# Patient Record
Sex: MALE | Race: WHITE | NOT HISPANIC OR LATINO | Employment: OTHER | URBAN - METROPOLITAN AREA
[De-identification: names, ages, dates, MRNs, and addresses within clinical notes are randomized per-mention and may not be internally consistent; named-entity substitution may affect disease eponyms.]

---

## 2017-07-13 ENCOUNTER — HOSPITAL ENCOUNTER (OUTPATIENT)
Dept: LAB | Facility: MEDICAL CENTER | Age: 52
End: 2017-07-13
Attending: PHYSICIAN ASSISTANT
Payer: COMMERCIAL

## 2017-07-13 ENCOUNTER — OFFICE VISIT (OUTPATIENT)
Dept: ENDOCRINOLOGY | Facility: MEDICAL CENTER | Age: 52
End: 2017-07-13
Payer: COMMERCIAL

## 2017-07-13 VITALS
WEIGHT: 177 LBS | HEART RATE: 86 BPM | BODY MASS INDEX: 23.98 KG/M2 | HEIGHT: 72 IN | DIASTOLIC BLOOD PRESSURE: 84 MMHG | OXYGEN SATURATION: 96 % | SYSTOLIC BLOOD PRESSURE: 138 MMHG

## 2017-07-13 DIAGNOSIS — E13.9 LADA (LATENT AUTOIMMUNE DIABETES IN ADULTS), MANAGED AS TYPE 2 (HCC): ICD-10-CM

## 2017-07-13 DIAGNOSIS — E10.649 TYPE 1 DIABETES MELLITUS WITH HYPOGLYCEMIA UNAWARENESS (HCC): ICD-10-CM

## 2017-07-13 DIAGNOSIS — Z79.4 ENCOUNTER FOR LONG-TERM (CURRENT) USE OF INSULIN (HCC): ICD-10-CM

## 2017-07-13 LAB
EST. AVERAGE GLUCOSE BLD GHB EST-MCNC: 120 MG/DL
HBA1C MFR BLD: 5.8 % (ref 0–5.6)

## 2017-07-13 PROCEDURE — 36415 COLL VENOUS BLD VENIPUNCTURE: CPT

## 2017-07-13 PROCEDURE — 84681 ASSAY OF C-PEPTIDE: CPT

## 2017-07-13 PROCEDURE — 83036 HEMOGLOBIN GLYCOSYLATED A1C: CPT

## 2017-07-13 PROCEDURE — 83516 IMMUNOASSAY NONANTIBODY: CPT

## 2017-07-13 PROCEDURE — 99204 OFFICE O/P NEW MOD 45 MIN: CPT | Performed by: PHYSICIAN ASSISTANT

## 2017-07-13 PROCEDURE — 95251 CONT GLUC MNTR ANALYSIS I&R: CPT | Performed by: PHYSICIAN ASSISTANT

## 2017-07-13 PROCEDURE — 86341 ISLET CELL ANTIBODY: CPT

## 2017-07-13 PROCEDURE — 86337 INSULIN ANTIBODIES: CPT

## 2017-07-13 RX ORDER — INSULIN GLARGINE 100 [IU]/ML
INJECTION, SOLUTION SUBCUTANEOUS NIGHTLY
COMMUNITY
End: 2017-07-24

## 2017-07-13 NOTE — PATIENT INSTRUCTIONS
START:  1. Trulicity 0.75 once a week  2.  Lantus 2 units for the next 3-4 days.  Then stop the Lantus  3.  Humalog stop only use if numbers are very high above 300 and if this happens give          2-3 units and drink a lot of water.  4.  Ketone Urine test strips - check every other day.  If Ketone levels start to increase let me know.  If Ketones increase the way to get this down is to drink a lot of water and take Humalog.

## 2017-07-13 NOTE — PROGRESS NOTES
New Patient Consult Note  Referred by: Mikael Tineo M.D.    Reason for consult: Type 1.5 with a CGM    HPI:  Aaron Rivero is a 51 y.o. old patient who is seeing us today for diabetes care.  This is a pleasant patient with diabetes and I appreciate the opportunity to participate in the care of this patient.  This is a new patient with me today.    From [facility]:  His  C-peptide is 0.34 and IAN-65  >250      BG Diary:7/13/2017  In the AM:  Did not bring    He has a Dexcom 5 CGM - this certainly helps him with his Hypoglycemic events.       Has been Diabetic since age 50  Has a Glucagon pen at home: no    1. 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    He is on Lantus 4 units  Humalog   1:15 carb ratio  ISF - He does not use    He was just diagnosed within the last 12 months.  If he uses more than 4 units of lantus he has hypoglycemic events.  He is not using a ISF because it will tank him.  He will decrease the Lantus to 2-3 units if he knows he is going to be exercising the next day, because if he does not it will tank his numbers.      This is a very interesting patient and I have called for his C-peptide and antibodies.        2. Encounter for long-term (current) use of insulin (CMS-HCC)    He is having lows weekly.     ROS:   Constitutional: No change in weight , No fatigue, No night sweats.  HEENT: No Headache.  Eyes:  No blurred vision, No visual changes.  Cardiac: No chest pain, No palpitations.  Resp: No shortness of breath, No cough,   Gastro: No nausea or vomiting, No diarrhea.  Neuro: Denies numbness or tinging in bilateral feet or hands, and no loss of sensation.  Endo: No heat or cold intolerance.  : No polyuria, No polydipsia, No chronic UTI's.  Lower extremities: No lower leg edema bilateral.  All other systems were reviewed and were  negative.    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 No pertinent past surgical history.    Allergies:  Review of patient's allergies indicates no known allergies.    Social History:  Social History     Social History   • Marital Status:      Spouse Name: N/A   • Number of Children: N/A   • Years of Education: N/A     Occupational History   • Not on file.     Social History Main Topics   • Smoking status: Never Smoker    • Smokeless tobacco: Never Used   • Alcohol Use: Yes   • Drug Use: No   • Sexual Activity: Not on file     Other Topics Concern   • Not on file     Social History Narrative   • No narrative on file       Family History:  Family History   Problem Relation Age of Onset   • Cancer Mother    • Heart Disease Paternal Grandmother        Medications:    Current outpatient prescriptions:   •  Insulin Lispro (HUMALOG) 100 UNIT/ML Solution Cartridge, Inject  as instructed., Disp: , Rfl:   •  insulin glargine (LANTUS) 100 UNIT/ML Solution, Inject  as instructed every evening., Disp: , Rfl:   •  Ketone Blood Test (PRECISION XTRA) Strip, 1 Strip by In Vitro route as needed., Disp: 20 Strip, Rfl: 5      Physical Examination:   Vital signs: /84 mmHg  Pulse 86  Ht 1.829 m (6')  Wt 80.287 kg (177 lb)  BMI 24.00 kg/m2  SpO2 96%  General: No distress, cooperative, well dressed and well nourished.   Eyes: No scleral icterus or discharge, No hyposphagma  ENMT: Normal on external inspection of nose, lips, No nasal drainage   Neck: No abnormal masses on inspection  Resp: Normal effort, Bilateral clear to auscultation, No wheezing, No rales  CVS: Regular rate and rhythm, S1 S2 normal, No murmur. No gallop  Extremities: No edema bilateral extremities  Neuro: Alert  and oriented  Skin: No rash, No Ulcers  Psych: Normal mood and affect    Assessment and Plan:    1. Type 1 diabetes mellitus with hypoglycemia unawareness (CMS-HCC)  This is a new patient with me today and is being seen in [place] and getting very good help with his T1.    He is on Lantus 4 units  Humalog   1:15 carb ratio  ISF - He does not use    I am going to stop Insulin  I received his labs after he left the office and I called him to come back.  His  C-peptide is 0.34 and IAN-65  >250    START:  1. Trulicity 0.75 once a week  2.  Lantus 2 units for the next 3-4 days.  Then stop the Lantus  3.  Humalog stop only use if numbers are very high above 300 and if this happens give          2-3 units and drink a lot of water.  4.  Ketone Urine test strips - check every other day.  If Ketone levels start to increase let me know.  If Ketones increase the way to get this down is to drink a lot of water and take Humalog.    5.   Labs ordered, C-peptide, and all antibodies.  Since he is IAN-65 positive he needs to think about testing his children.   6.  He came in wanting to discuss a pump.  I think he would be better served and he agrees if we can get him off insulin.  This is possible unless he has Insulin Ab if that is the case I will leave him on a little insulin.       2. Encounter for long-term (current) use of insulin (CMS-HCC)  Going low too much I will get him off of insulin at this point.  He will transition to a full T1 at some point but he is not as of yet      Return in about 1 week (around 7/20/2017).    Blood glucose log: Check BG in the morning when wake up, before lunch or dinner and before bed.  So three times a day.  Always bring BG diary to the next office visit.     Thank you kindly for allowing me to participate in the diabetes care plan for this patient.    Ace Sung PA-C, BC-ADM  07/13/2017    CC:   Mikael Tineo M.D.

## 2017-07-13 NOTE — MR AVS SNAPSHOT
Aaron Rivero   2017 9:40 AM   Office Visit   MRN: [id number]    Department:  [facility]   Dept Phone:  [phone]    Description:  Male : 1965   Provider:  Farhat Sung PA-C           Reason for Visit     New Patient pt states had A1c done 17 5.5      Allergies as of 2017     No Known Allergies      You were diagnosed with     Type 1 diabetes mellitus with hypoglycemia unawareness (CMS-HCC)   [395337]       Encounter for long-term (current) use of insulin (CMS-HCC)   [V58.67.ICD-9-CM]         Vital Signs     Blood Pressure Pulse Height Weight Body Mass Index Oxygen Saturation    138/84 mmHg 86 1.829 m (6') 80.287 kg (177 lb) 24.00 kg/m2 96%    Smoking Status                   Never Smoker            Basic Information     Date Of Birth Sex Race Ethnicity Preferred Language    1965 Male White Non- English      Problem List              ICD-10-CM Priority Class Noted - Resolved    Type 1 diabetes mellitus with hypoglycemia unawareness (CMS-HCC) E10.649   2017 - Present    Encounter for long-term (current) use of insulin (CMS-HCC) Z79.4   2017 - Present      Health Maintenance        Date Due Completion Dates    IMM DTaP/Tdap/Td Vaccine (1 - Tdap) 1983 ---    COLONOSCOPY 2014 ---    IMM INFLUENZA (1) 2017 ---            Current Immunizations     No immunizations on file.      Below and/or attached are the medications your provider expects you to take. Review all of your home medications and newly ordered medications with your provider and/or pharmacist. Follow medication instructions as directed by your provider and/or pharmacist. Please keep your medication list with you and share with your provider. Update the information when medications are discontinued, doses are changed, or new medications (including over-the-counter products) are added; and carry medication information at all times in the event of emergency situations     Allergies:  No Known Allergies          Medications  Valid as of: July 13, 2017 - 10:28 AM    Generic Name Brand Name Tablet Size Instructions for use    Insulin Glargine (Solution) LANTUS 100 UNIT/ML Inject  as instructed every evening.        Insulin Lispro (Solution Cartridge) Insulin Lispro 100 UNIT/ML Inject  as instructed.        .                 Medicines prescribed today were sent to:     [facility]ARMACY #[id number] - [place], [place] - [address]    [address] [place] [address]    Phone: [phone] Fax: [phone]    Open 24 Hours?: No      Medication refill instructions:       If your prescription bottle indicates you have medication refills left, it is not necessary to call your provider’s office. Please contact your pharmacy and they will refill your medication.    If your prescription bottle indicates you do not have any refills left, you may request refills at any time through one of the following ways: The online Bulu Box system (except Urgent Care), by calling your provider’s office, or by asking your pharmacy to contact your provider’s office with a refill request. Medication refills are processed only during regular business hours and may not be available until the next business day. Your provider may request additional information or to have a follow-up visit with you prior to refilling your medication.   *Please Note: Medication refills are assigned a new Rx number when refilled electronically. Your pharmacy may indicate that no refills were authorized even though a new prescription for the same medication is available at the pharmacy. Please request the medicine by name with the pharmacy before contacting your provider for a refill.        Your To Do List     Future Labs/Procedures Complete By Expires    ANTIPANCREATIC ISLET  As directed 7/13/2018    C-PEPTIDE  As directed 7/13/2018    IAN-65  As directed 7/13/2018    HEMOGLOBIN A1C  As directed 7/13/2018    INSULIN ANTIBODIES  As directed  7/13/2018         Thinkr Access Code: [email or web address]  Expires: 8/12/2017 10:28 AM    Your email address is not on file at [facility].  Email Addresses are required for you to sign up for Thinkr, please contact [phone] to verify your personal information and to provide your email address prior to attempting to register for Thinkr.    Aaron Josefa  [address] [place] [address]    Thinkr  A secure, online tool to manage your health information     [facility]’s Thinkr® is a secure, online tool that connects you to your personalized health information from the privacy of your home -- day or night - making it very easy for you to manage your healthcare. Once the activation process is completed, you can even access your medical information using the Thinkr mari, which is available for free in the Apple Mari store or Google Play store.     To learn more about Thinkr, visit [email or web address]    There are two levels of access available (as shown below):   My Chart Features  [facility] Primary Care Doctor [facility]  Specialists [facility]  Urgent  Care Non-[facility] Primary Care Doctor   Email your healthcare team securely and privately 24/7 X X X    Manage appointments: schedule your next appointment; view details of past/upcoming appointments X      Request prescription refills. X      View recent personal medical records, including lab and immunizations X X X X   View health record, including health history, allergies, medications X X X X   Read reports about your outpatient visits, procedures, consult and ER notes X X X X   See your discharge summary, which is a recap of your hospital and/or ER visit that includes your diagnosis, lab results, and care plan X X  X     How to register for Thinkr:  Once your e-mail address has been verified, follow the following steps to sign up for [employer].     1. Go to  [email or web address]  2. Click on the Sign Up Now box, which takes you to the New Member Sign Up  page. You will need to provide the following information:  a. Enter your Augur Access Code exactly as it appears at the top of this page. (You will not need to use this code after you’ve completed the sign-up process. If you do not sign up before the expiration date, you must request a new code.)   b. Enter your date of birth.   c. Enter your home email address.   d. Click Submit, and follow the next screen’s instructions.  3. Create a Augur ID. This will be your Augur login ID and cannot be changed, so think of one that is secure and easy to remember.  4. Create a Augur password. You can change your password at any time.  5. Enter your Password Reset Question and Answer. This can be used at a later time if you forget your password.   6. Enter your e-mail address. This allows you to receive e-mail notifications when new information is available in Augur.  7. Click Sign Up. You can now view your health information.    For assistance activating your Augur account, call [phone]

## 2017-07-14 LAB — C PEPTIDE SERPL-MCNC: 1.1 NG/ML (ref 0.8–3.5)

## 2017-07-15 LAB — GAD65 AB SER IA-ACNC: >250 IU/ML (ref 0–5)

## 2017-07-16 LAB — PANC ISLET CELL AB TITR SER: NORMAL {TITER}

## 2017-07-17 LAB — INSULIN HUMAN AB SER-ACNC: 8.7 U/ML (ref 0–0.4)

## 2017-07-24 ENCOUNTER — OFFICE VISIT (OUTPATIENT)
Dept: ENDOCRINOLOGY | Facility: MEDICAL CENTER | Age: 52
End: 2017-07-24
Payer: COMMERCIAL

## 2017-07-24 VITALS
SYSTOLIC BLOOD PRESSURE: 148 MMHG | DIASTOLIC BLOOD PRESSURE: 88 MMHG | WEIGHT: 175 LBS | HEART RATE: 89 BPM | HEIGHT: 72 IN | OXYGEN SATURATION: 97 % | BODY MASS INDEX: 23.7 KG/M2

## 2017-07-24 DIAGNOSIS — Z79.4 ENCOUNTER FOR LONG-TERM (CURRENT) USE OF INSULIN (HCC): ICD-10-CM

## 2017-07-24 DIAGNOSIS — E13.9 LADA (LATENT AUTOIMMUNE DIABETES IN ADULTS), MANAGED AS TYPE 2 (HCC): ICD-10-CM

## 2017-07-24 PROCEDURE — 99214 OFFICE O/P EST MOD 30 MIN: CPT | Performed by: PHYSICIAN ASSISTANT

## 2017-07-24 NOTE — PROGRESS NOTES
Return to office Patient Consult Note  Referred by: Mikael Tineo M.D.    Reason for consult: Diabetes Management Type 1.5    HPI:  Aaron Chaudhari is a 51 y.o. old patient who is seeing us today for diabetes care.  This is a pleasant patient with diabetes and I appreciate the opportunity to participate in the care of this patient.    BG Diary:7/24/2017  In the AM: 103, 110, 109, 102, 114, 107, 112, 118  Before meal:  Before Bed: 91, 113, 169, 104, 128, 111, 108     From [facility] 5/2017:  His  C-peptide is 0.34 and IAN-65  >250  Labs of 7/17/17 C-peptide 1.1, IAN>250, Insulin Ab. 8.7, Islet cell negative    BG Diary:7/13/2017  In the AM:  Did not bring    He has a Dexcom 5 CGM - this certainly helps him with his Hypoglycemic events.       1. Encounter for long-term (current) use of insulin (CMS-HCC)  This is a new patient with me today and is being seen in [place] and getting very good help with his diabetes from his PCP.    He is on Lantus 4 units  Humalog    1:15 carb ratio  ISF - He does not use    He was just diagnosed within the last 12 months.  If he uses more than 4 units of lantus he has hypoglycemic events.  He is not using a ISF because it will tank him.  He will decrease the Lantus to 2-3 units if he knows he is going to be exercising the next day, because if he does not it will tank his numbers.      This is a new patient with me today and is being seen in [place] and getting very good help with his T1.    He is on Lantus 4 units  Humalog    1:15 carb ratio  ISF - He does not use    I am going to stop Insulin  I received his labs after he left the office and I called him to come back.  His  C-peptide is 0.34 and IAN-65  >250    START:  1. Trulicity 0.75 once a week  2.  Lantus (Stopped)  3.  Humalog stop only use if numbers are very high above 300 and if this happens give           2-3 units and drink a lot of water.  4.  Ketone Urine test strips - check every other day.  If Ketone levels  start to increase let me know.  If Ketones increase the way to get this down is to drink a lot of water and take Humalog.    5.   Labs ordered, C-peptide, and all antibodies.  Since he is IAN-65 positive he needs to think about testing his children.    6.  He came in wanting to discuss a pump.  I think he would be better served and he agrees if we can get him off insulin.  This is possible unless he has Insulin Ab if that is the case I will leave him on a little insulin.       2. 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  Eyes:  No visual changes.  Cardiac: No chest pain, No palpitations or racing heart rate.  Resp: No shortness of breath, No cough,   Gi: No Diarrhea    All other systems were reviewed and were/are negative.  The ROS was revised/revisited during this office visit from the patients first office visit with me on 7/13/17 Please review the full ROS during the first office visit.    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 No pertinent past surgical history.    Allergies:  Review of patient's allergies indicates no known allergies.    Social History:  Social History     Social History   • Marital Status:      Spouse Name: N/A   • Number of Children: N/A   • Years of Education: N/A     Occupational History   • Not on file.     Social History Main Topics   • Smoking status: Never Smoker    • Smokeless tobacco: Never Used   • Alcohol Use: Yes   • Drug Use: No   • Sexual Activity: Not on file     Other Topics Concern   • Not on file     Social History Narrative       Family History:  Family History   Problem Relation Age of Onset   • Cancer Mother    • Heart Disease Paternal Grandmother  "       Medications:    Current outpatient prescriptions:   •  Dulaglutide (TRULICITY) 0.75 MG/0.5ML Solution Pen-injector, Inject 1 PEN as instructed every 7 days., Disp: 4 PEN, Rfl: 11  •  Ketone Blood Test (PRECISION XTRA) Strip, 1 Strip by In Vitro route as needed., Disp: 20 Strip, Rfl: 5      Physical Examination:   Vital signs: /88 mmHg  Pulse 89  Ht 1.829 m (6' 0.01\")  Wt 79.379 kg (175 lb)  BMI 23.73 kg/m2  SpO2 97%  General: No distress, cooperative, well dressed and well nourished.   Eyes: No scleral icterus or discharge, No hyposphagma  ENMT: Normal on external inspection of nose, lips, No nasal drainage   Neck: No abnormal masses on inspection  Resp: Normal effort, Bilateral clear to auscultation, No wheezing, No rales  CVS: Regular rate and rhythm, S1 S2 normal, No murmur. No gallop  Extremities: No edema bilateral extremities  Neuro: Alert and oriented  Skin: No rash, No Ulcers  Psych: Normal mood and affect      Assessment and Plan:    1. Encounter for long-term (current) use of insulin (CMS-HCC)  He is doing very well on just Trulicity 0.75.  With having Insulin Ab.  I would retest this in 6 months to see if it increases at all. Id recheck a cpeptide in 3 months as well to see how high this comes up.  Other wise hopefully he is stable with the Trulicity for the next 20 years    2. 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 > Than 50% of this time was spent counseling, discussing problems documented above and below, coordinating care and answering questions by the physician assistant.  We developed a diabetes care plan for this patient today.    No Follow-up on file.    Blood glucose log: Check BG in the morning when wake up, before lunch or dinner and before bed.  So three times a day.  Always bring BG diary to " the next office visit.     Thank you kindly for allowing me to participate in the diabetes care plan for this patient.    Ace Sung PA-C, BC-[facility]  Board Certified - Advanced Diabetes Management  07/24/2017    CC:   Mikael Tineo M.D.

## 2017-07-24 NOTE — MR AVS SNAPSHOT
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), managed as type 2 (CMS-HCC)   [805497]         Vital Signs     Blood Pressure Pulse Height Weight Body Mass Index Oxygen Saturation    148/88 mmHg 89 1.829 m (6' 0.01\") 79.379 kg (175 lb) 23.73 kg/m2 97%    Smoking Status                   Never Smoker            Basic Information     Date Of Birth Sex Race Ethnicity Preferred Language    1965 Male White Non- English      Problem List              ICD-10-CM Priority Class Noted - Resolved    Encounter for long-term (current) use of insulin (CMS-HCC) Z79.4   2017 - Present    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), managed as type 2 (CMS-HCC) E13.9   2017 - Present      Health Maintenance        Date Due Completion Dates    IMM HEP B VACCINE (1 of 3 - Primary Series) 1965 ---    DIABETES MONOFILAMENT / LE EXAM 1965 ---    RETINAL SCREENING 1982 ---    FASTING LIPID PROFILE 1982 ---    URINE ACR / MICROALBUMIN 1982 ---    SERUM CREATININE 1982 ---    IMM DTaP/Tdap/Td Vaccine (1 - Tdap) 1983 ---    IMM PNEUMOCOCCAL 19-64 (ADULT) MEDIUM RISK SERIES (1 of 1 - PPSV23) 1983 ---    COLONOSCOPY 2014 ---    IMM INFLUENZA (1) 2017 ---    A1C SCREENING 2018            Current Immunizations     No immunizations on file.      Below and/or attached are the medications your provider expects you to take. Review all of your home medications and newly ordered medications with your provider and/or pharmacist. " Follow medication instructions as directed by your provider and/or pharmacist. Please keep your medication list with you and share with your provider. Update the information when medications are discontinued, doses are changed, or new medications (including over-the-counter products) are added; and carry medication information at all times in the event of emergency situations     Allergies:  No Known Allergies          Medications  Valid as of: July 24, 2017 -  4:52 PM    Generic Name Brand Name Tablet Size Instructions for use    Dulaglutide (Solution Pen-injector) Dulaglutide 0.75 MG/0.5ML Inject 1 PEN as instructed every 7 days.        Ketone Blood Test (Strip) Ketone Blood Test  1 Strip by In Vitro route as needed.        .                 Medicines prescribed today were sent to:     [facility]/PHARMACY #[id number] - [place] - [address]    [address] [address] [place] [address]    Phone: [phone] Fax: [phone]    Open 24 Hours?: No      Medication refill instructions:       If your prescription bottle indicates you have medication refills left, it is not necessary to call your provider’s office. Please contact your pharmacy and they will refill your medication.    If your prescription bottle indicates you do not have any refills left, you may request refills at any time through one of the following ways: The online [employer] system (except Urgent Care), by calling your provider’s office, or by asking your pharmacy to contact your provider’s office with a refill request. Medication refills are processed only during regular business hours and may not be available until the next business day. Your provider may request additional information or to have a follow-up visit with you prior to refilling your medication.   *Please Note: Medication refills are assigned a new Rx number when refilled electronically. Your pharmacy may indicate that no refills were authorized even though a new prescription for the same  medication is available at the pharmacy. Please request the medicine by name with the pharmacy before contacting your provider for a refill.           MyChart Access Code: Activation code not generated  Current MyChart Status: Active

## 2018-01-29 ENCOUNTER — OFFICE VISIT (OUTPATIENT)
Dept: ENDOCRINOLOGY | Facility: MEDICAL CENTER | Age: 53
End: 2018-01-29
Payer: COMMERCIAL

## 2018-01-29 ENCOUNTER — HOSPITAL ENCOUNTER (OUTPATIENT)
Dept: LAB | Facility: MEDICAL CENTER | Age: 53
End: 2018-01-29
Attending: PHYSICIAN ASSISTANT
Payer: COMMERCIAL

## 2018-01-29 VITALS
OXYGEN SATURATION: 100 % | HEIGHT: 72 IN | BODY MASS INDEX: 23.43 KG/M2 | HEART RATE: 90 BPM | WEIGHT: 173 LBS | DIASTOLIC BLOOD PRESSURE: 86 MMHG | SYSTOLIC BLOOD PRESSURE: 132 MMHG

## 2018-01-29 DIAGNOSIS — E13.9 LADA (LATENT AUTOIMMUNE DIABETES IN ADULTS), MANAGED AS TYPE 2 (HCC): ICD-10-CM

## 2018-01-29 DIAGNOSIS — Z79.4 ENCOUNTER FOR LONG-TERM (CURRENT) USE OF INSULIN (HCC): ICD-10-CM

## 2018-01-29 LAB
ALBUMIN SERPL BCP-MCNC: 5.3 G/DL (ref 3.2–4.9)
ALBUMIN/GLOB SERPL: 2.9 G/DL
ALP SERPL-CCNC: 37 U/L (ref 30–99)
ALT SERPL-CCNC: 23 U/L (ref 2–50)
ANION GAP SERPL CALC-SCNC: 6 MMOL/L (ref 0–11.9)
AST SERPL-CCNC: 25 U/L (ref 12–45)
BASOPHILS # BLD AUTO: 0.6 % (ref 0–1.8)
BASOPHILS # BLD: 0.02 K/UL (ref 0–0.12)
BILIRUB SERPL-MCNC: 0.9 MG/DL (ref 0.1–1.5)
BUN SERPL-MCNC: 19 MG/DL (ref 8–22)
CALCIUM SERPL-MCNC: 9.5 MG/DL (ref 8.5–10.5)
CHLORIDE SERPL-SCNC: 101 MMOL/L (ref 96–112)
CHOLEST SERPL-MCNC: 182 MG/DL (ref 100–199)
CO2 SERPL-SCNC: 30 MMOL/L (ref 20–33)
CREAT SERPL-MCNC: 0.93 MG/DL (ref 0.5–1.4)
EOSINOPHIL # BLD AUTO: 0.1 K/UL (ref 0–0.51)
EOSINOPHIL NFR BLD: 2.9 % (ref 0–6.9)
ERYTHROCYTE [DISTWIDTH] IN BLOOD BY AUTOMATED COUNT: 42.3 FL (ref 35.9–50)
EST. AVERAGE GLUCOSE BLD GHB EST-MCNC: 134 MG/DL
GLOBULIN SER CALC-MCNC: 1.8 G/DL (ref 1.9–3.5)
GLUCOSE SERPL-MCNC: 150 MG/DL (ref 65–99)
HBA1C MFR BLD: 6.3 % (ref 0–5.6)
HCT VFR BLD AUTO: 44.7 % (ref 42–52)
HDLC SERPL-MCNC: 69 MG/DL
HGB BLD-MCNC: 15.4 G/DL (ref 14–18)
IMM GRANULOCYTES # BLD AUTO: 0.01 K/UL (ref 0–0.11)
IMM GRANULOCYTES NFR BLD AUTO: 0.3 % (ref 0–0.9)
LDLC SERPL CALC-MCNC: 100 MG/DL
LYMPHOCYTES # BLD AUTO: 0.99 K/UL (ref 1–4.8)
LYMPHOCYTES NFR BLD: 28.9 % (ref 22–41)
MCH RBC QN AUTO: 32.9 PG (ref 27–33)
MCHC RBC AUTO-ENTMCNC: 34.5 G/DL (ref 33.7–35.3)
MCV RBC AUTO: 95.5 FL (ref 81.4–97.8)
MONOCYTES # BLD AUTO: 0.55 K/UL (ref 0–0.85)
MONOCYTES NFR BLD AUTO: 16 % (ref 0–13.4)
NEUTROPHILS # BLD AUTO: 1.76 K/UL (ref 1.82–7.42)
NEUTROPHILS NFR BLD: 51.3 % (ref 44–72)
NRBC # BLD AUTO: 0 K/UL
NRBC BLD-RTO: 0 /100 WBC
PLATELET # BLD AUTO: 197 K/UL (ref 164–446)
PMV BLD AUTO: 10.4 FL (ref 9–12.9)
POTASSIUM SERPL-SCNC: 4.3 MMOL/L (ref 3.6–5.5)
PROT SERPL-MCNC: 7.1 G/DL (ref 6–8.2)
RBC # BLD AUTO: 4.68 M/UL (ref 4.7–6.1)
SODIUM SERPL-SCNC: 137 MMOL/L (ref 135–145)
TRIGL SERPL-MCNC: 64 MG/DL (ref 0–149)
TSH SERPL DL<=0.005 MIU/L-ACNC: 1.97 UIU/ML (ref 0.38–5.33)
WBC # BLD AUTO: 3.4 K/UL (ref 4.8–10.8)

## 2018-01-29 PROCEDURE — 83036 HEMOGLOBIN GLYCOSYLATED A1C: CPT

## 2018-01-29 PROCEDURE — 99214 OFFICE O/P EST MOD 30 MIN: CPT | Performed by: PHYSICIAN ASSISTANT

## 2018-01-29 PROCEDURE — 85025 COMPLETE CBC W/AUTO DIFF WBC: CPT

## 2018-01-29 PROCEDURE — 36415 COLL VENOUS BLD VENIPUNCTURE: CPT

## 2018-01-29 PROCEDURE — 84443 ASSAY THYROID STIM HORMONE: CPT

## 2018-01-29 PROCEDURE — 80061 LIPID PANEL: CPT

## 2018-01-29 PROCEDURE — 80053 COMPREHEN METABOLIC PANEL: CPT

## 2018-01-29 PROCEDURE — 84681 ASSAY OF C-PEPTIDE: CPT

## 2018-01-29 NOTE — PROGRESS NOTES
Return to office Patient Consult Note  Referred by: Mikael Tineo M.D.    Reason for consult: Diabetes Management Type 1.5    HPI:  Aaron Chaudhari is a 52 y.o. old patient who is seeing us today for diabetes care.  This is a pleasant patient with diabetes and I appreciate the opportunity to participate in the care of this patient.    BG Diary:1/29/2018  In the AM:  Before meal:  Before Bed:    BG Diary:7/24/2017  In the AM: 103, 110, 109, 102, 114, 107, 112, 118  Before meal:  Before Bed: 91, 113, 169, 104, 128, 111, 108      From [facility] 5/2017:  His  C-peptide is 0.34 and IAN-65  >250  Labs of 7/17/17 C-peptide 1.1, IAN>250, Insulin Ab. 8.7, Islet cell negative     BG Diary:7/13/2017  In the AM:  Did not bring     He has a Dexcom 5 CGM - this certainly helps him with his Hypoglycemic events.          1. Encounter for long-term (current) use of insulin (CMS-HCC)  This is a new patient with me in July 2017 and is being seen in [place] and getting very good help with his diabetes from his PCP.    He was on Lantus 4 units  Humalog    1:15 carb ratio  ISF - He does not use     He was just diagnosed within the last 12 months.  If he uses more than 4 units of lantus he has hypoglycemic events.  He is not using a ISF because it will tank him.  He will decrease the Lantus to 2-3 units if he knows he is going to be exercising the next day, because if he does not it will tank his numbers.       This is a new patient with me today and is being seen in [place] and getting very good help with his T1.    He is on Lantus 4 units  Humalog    1:15 carb ratio  ISF - He does not use     He is now on::  1. Trulicity 0.75 once a week  2.  Lantus (Stopped)  3.  Humalog (only as needed)  4.  Ketone Urine test strips  5.   Labs ordered, C-peptide, and all antibodies.  Since he is IAN-65 positive he needs to think about testing his children.        ROS:   Constitutional: No night sweats.  Eyes:  No visual changes.  Cardiac: No  "chest pain, No palpitations or racing heart rate.  Resp: No shortness of breath, No cough,   Gi: No Diarrhea    All other systems were reviewed and were/are negative.  The ROS was revised/revisited during this office visit from the patients first office visit with me on 7/13/17  Please review the full ROS during the first office visit.    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 No pertinent surgical history.    Allergies:  Patient has no known allergies.    Social History:  Social History     Social History   • Marital status:      Spouse name: N/A   • Number of children: N/A   • Years of education: N/A     Occupational History   • Not on file.     Social History Main Topics   • Smoking status: Never Smoker   • Smokeless tobacco: Never Used   • Alcohol use Yes   • Drug use: No   • Sexual activity: Not on file     Other Topics Concern   • Not on file     Social History Narrative   • No narrative on file       Family History:  Family History   Problem Relation Age of Onset   • Cancer Mother    • Heart Disease Paternal Grandmother        Medications:    Current Outpatient Prescriptions:   •  Dulaglutide (TRULICITY) 0.75 MG/0.5ML Solution Pen-injector, Inject 1 PEN as instructed every 7 days., Disp: 4 PEN, Rfl: 11  •  Ketone Blood Test (PRECISION XTRA) Strip, 1 Strip by In Vitro route as needed., Disp: 20 Strip, Rfl: 5        Physical Examination:   Vital signs: /86   Pulse 90   Ht 1.829 m (6' 0.01\")   Wt 78.5 kg (173 lb)   SpO2 100%   BMI 23.46 kg/m²   General: No distress, cooperative, well dressed and well nourished.   Eyes: No scleral icterus or discharge, No hyposphagma  ENMT: Normal on external inspection of nose, lips, No nasal drainage   Neck: No " abnormal masses on inspection  Resp: Normal effort, Bilateral clear to auscultation, No wheezing, No rales  CVS: Regular rate and rhythm, S1 S2 normal, No murmur. No gallop  Extremities: No edema bilateral extremities  Neuro: Alert and oriented  Skin: No rash, No Ulcers  Psych: Normal mood and affect      Assessment and Plan:    1. Encounter for long-term (current) use of insulin (CMS-HCC)    He is now on:  1. Trulicity 0.75 once a week  2.  Lantus (Stopped)  3.  Humalog (only as needed)  4.  Ketone Urine test strips  5.   Labs ordered, C-peptide, and all antibodies.  Since he is IAN-65 positive he needs to think about testing his children.      2. 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  He is exercising     Return in about 6 months (around 7/29/2018).    Blood glucose log: Check BG in the morning when wake up, before lunch or dinner and before bed.  So three times a day.  Always bring BG diary to the next office visit.     Thank you kindly for allowing me to participate in the diabetes care plan for this patient.    Ace Sung PA-C, BC-ADM  Board Certified - Advanced Diabetes Management  01/29/18    CC:   Mikael Tineo M.D.

## 2018-01-30 LAB — C PEPTIDE SERPL-MCNC: 2.5 NG/ML (ref 0.8–3.5)

## 2018-03-08 ENCOUNTER — SUPERVISING PHYSICIAN REVIEW (OUTPATIENT)
Dept: ENDOCRINOLOGY | Facility: MEDICAL CENTER | Age: 53
End: 2018-03-08

## 2018-03-08 NOTE — PROGRESS NOTES
I have reviewed and agree with history, assessment and plan for office encounter on 01/29/18 with midlevel provider: Ace Sung.  Face to face encounter/direct observation: No  Suggested changes to plan or follow-up: none    Very good overall management.    Edgar Singh M.D.

## 2018-07-12 ENCOUNTER — TELEPHONE (OUTPATIENT)
Dept: ENDOCRINOLOGY | Facility: MEDICAL CENTER | Age: 53
End: 2018-07-12

## 2018-07-12 NOTE — TELEPHONE ENCOUNTER
Phone Number Called: [phone] (home) [phone] (work)      Message: lm for patient as to how he would like to get his lab slip    Left Message for patient to call back: yes

## 2018-07-26 ENCOUNTER — HOSPITAL ENCOUNTER (OUTPATIENT)
Dept: LAB | Facility: MEDICAL CENTER | Age: 53
End: 2018-07-26
Attending: PHYSICIAN ASSISTANT
Payer: COMMERCIAL

## 2018-07-26 DIAGNOSIS — E13.9 LADA (LATENT AUTOIMMUNE DIABETES IN ADULTS), MANAGED AS TYPE 2 (HCC): ICD-10-CM

## 2018-07-26 DIAGNOSIS — Z79.4 ENCOUNTER FOR LONG-TERM (CURRENT) USE OF INSULIN (HCC): ICD-10-CM

## 2018-07-26 PROCEDURE — 83036 HEMOGLOBIN GLYCOSYLATED A1C: CPT

## 2018-07-26 PROCEDURE — 36415 COLL VENOUS BLD VENIPUNCTURE: CPT

## 2018-07-26 PROCEDURE — 86341 ISLET CELL ANTIBODY: CPT

## 2018-07-26 PROCEDURE — 84681 ASSAY OF C-PEPTIDE: CPT

## 2018-07-26 RX ORDER — DULAGLUTIDE 0.75 MG/.5ML
1 INJECTION, SOLUTION SUBCUTANEOUS
Qty: 4 PEN | Refills: 8 | Status: SHIPPED | OUTPATIENT
Start: 2018-07-26 | End: 2019-03-22

## 2018-07-26 NOTE — TELEPHONE ENCOUNTER
Was the patient seen in the last year in this department? Yes    Does patient have an active prescription for medications requested? No     Received Request Via: Pharmacy     TRULICITY 0.75 MG/0.5ML Solution Pen-injector  Inject 1 PEN as instructed every 7 days.

## 2018-07-27 LAB
C PEPTIDE SERPL-MCNC: 0.9 NG/ML (ref 0.8–3.5)
EST. AVERAGE GLUCOSE BLD GHB EST-MCNC: 151 MG/DL
HBA1C MFR BLD: 6.9 % (ref 0–5.6)

## 2018-07-29 LAB — GAD65 AB SER IA-ACNC: >250 IU/ML (ref 0–5)

## 2018-08-07 ENCOUNTER — OFFICE VISIT (OUTPATIENT)
Dept: ENDOCRINOLOGY | Facility: MEDICAL CENTER | Age: 53
End: 2018-08-07
Payer: COMMERCIAL

## 2018-08-07 VITALS
BODY MASS INDEX: 21.97 KG/M2 | OXYGEN SATURATION: 98 % | DIASTOLIC BLOOD PRESSURE: 82 MMHG | SYSTOLIC BLOOD PRESSURE: 122 MMHG | HEIGHT: 72 IN | HEART RATE: 93 BPM | WEIGHT: 162.2 LBS

## 2018-08-07 DIAGNOSIS — E13.9 LADA (LATENT AUTOIMMUNE DIABETES IN ADULTS), MANAGED AS TYPE 2 (HCC): ICD-10-CM

## 2018-08-07 DIAGNOSIS — Z79.4 ENCOUNTER FOR LONG-TERM (CURRENT) USE OF INSULIN (HCC): ICD-10-CM

## 2018-08-07 PROCEDURE — 99214 OFFICE O/P EST MOD 30 MIN: CPT | Performed by: PHYSICIAN ASSISTANT

## 2018-08-07 NOTE — PROGRESS NOTES
Return to office Patient Consult Note  Referred by: Mikael Tineo M.D.    Reason for consult: Diabetes Management Type 2    HPI:  Aaron Chaudhari is a 52 y.o. old patient who is seeing us today for diabetes care.  This is a pleasant patient with diabetes and I appreciate the opportunity to participate in the care of this patient.    BG Diary:8/7/2018  In the AM: no log    BG Diary:7/24/2017  In the AM: 103, 110, 109, 102, 114, 107, 112, 118  Before Bed: 91, 113, 169, 104, 128, 111, 108      Labs of 7/26/18 c-peptid is 0.9, IAN-65 >250, HbA1c is 6.9  From [facility] 5/2017:  His  C-peptide is 0.34 and IAN-65  >250  Labs of 7/17/17 C-peptide 1.1, IAN>250, Insulin Ab. 8.7, Islet cell negative     BG Diary:7/13/2017  In the AM:  Did not bring     He has a Dexcom 5 CGM - this certainly helps him with his Hypoglycemic events.       1. Encounter for long-term (current) use of insulin (HCC)  This is a new patient with me in July 2017 and is being seen in [place] and getting very good help with his diabetes from his PCP.    He was on Lantus 4 units  Humalog    1:15 carb ratio  ISF - He does not use     He was just diagnosed within the last 12 months.  If he uses more than 4 units of lantus he has hypoglycemic events.  He is not using a ISF because it will tank him.  He will decrease the Lantus to 2-3 units if he knows he is going to be exercising the next day, because if he does not it will tank his numbers.       This is a new patient with me today and is being seen in [place] and getting very good help with his T1.    He is on Lantus 4 units  Humalog    1:15 carb ratio  ISF - He does not use     He is now on::  1. Trulicity 0.75 once a week  2.  Lantus (Stopped)  3.  Humalog (only as needed)  4.  Ketone Urine test strips  5.   Labs ordered, C-peptide, and all antibodies.  Since he is IAN-65 positive he needs to think about testing his children.      2. 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  Eyes:  No visual changes.  Cardiac: No chest pain, No palpitations or racing heart rate.  Resp: No shortness of breath, No cough,   Gi: No Diarrhea    All other systems were reviewed and were/are negative.  The ROS was revised/revisited during this office visit from the patients first office visit with me on 7/13/17 Please review the full ROS during the first office visit.    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 No pertinent surgical history.    Allergies:  Patient has no known allergies.    Social History:  Social History     Social History   • Marital status:      Spouse name: N/A   • Number of children: N/A   • Years of education: N/A     Occupational History   • Not on file.     Social History Main Topics   • Smoking status: Never Smoker   • Smokeless tobacco: Never Used   • Alcohol use Yes   • Drug use: No   • Sexual activity: Not on file     Other Topics Concern   • Not on file     Social History Narrative   • No narrative on file       Family History:  Family History   Problem Relation Age of Onset   • Cancer Mother    • Heart Disease Paternal Grandmother        Medications:    Current Outpatient Prescriptions:   •  TRULICITY 0.75 MG/0.5ML Solution Pen-injector, INJECT 1 PEN AS INSTRUCTED EVERY 7 DAYS., Disp: 4 PEN, Rfl: 8  •  Ketone Blood Test (PRECISION XTRA) Strip, 1 Strip by In Vitro route as needed., Disp: 20 Strip, Rfl: 5        Physical Examination:   Vital signs: /82   Pulse 93   Ht 1.829 m (6')   Wt 73.6 kg (162 lb 3.2 oz)   SpO2 98%   BMI 22.00 kg/m²   General: No distress, cooperative, well dressed and well nourished.   Eyes: No scleral icterus or discharge, No  hyposphagma  ENMT: Normal on external inspection of nose, lips, No nasal drainage   Neck: No abnormal masses on inspection  Resp: Normal effort, Bilateral clear to auscultation, No wheezing, No rales  CVS: Regular rate and rhythm, S1 S2 normal, No murmur. No gallop  Extremities: No edema bilateral extremities  Neuro: Alert and oriented  Skin: No rash, No Ulcers  Psych: Normal mood and affect      Assessment and Plan:    1. Encounter for long-term (current) use of insulin (HCC)    He is now on::  1. Trulicity 0.75 once a week  2.  Lantus (Stopped)  3.  Humalog (only as needed)  4.  Ketone Urine test strips  5.   Labs ordered, C-peptide, and all antibodies.  Since he is IAN-65 positive he needs to think about testing his children.      2. 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    Blood glucose log: Check BG in the morning when wake up, before lunch or dinner and before bed.  So three times a day.  Always bring BG diary to the next office visit.     Thank you kindly for allowing me to participate in the diabetes care plan for this patient.    Ace Sung PA-C, BC-ADM  Board Certified - Advanced Diabetes Management  08/07/18    CC:   Mikael Tineo M.D.

## 2019-02-01 ENCOUNTER — HOSPITAL ENCOUNTER (OUTPATIENT)
Dept: LAB | Facility: MEDICAL CENTER | Age: 54
End: 2019-02-01
Attending: PHYSICIAN ASSISTANT
Payer: COMMERCIAL

## 2019-02-01 DIAGNOSIS — E13.9 LADA (LATENT AUTOIMMUNE DIABETES IN ADULTS), MANAGED AS TYPE 2 (HCC): ICD-10-CM

## 2019-02-01 DIAGNOSIS — Z79.4 ENCOUNTER FOR LONG-TERM (CURRENT) USE OF INSULIN (HCC): ICD-10-CM

## 2019-02-01 LAB
ALBUMIN SERPL BCP-MCNC: 4.9 G/DL (ref 3.2–4.9)
ALBUMIN/GLOB SERPL: 2.1 G/DL
ALP SERPL-CCNC: 56 U/L (ref 30–99)
ALT SERPL-CCNC: 33 U/L (ref 2–50)
ANION GAP SERPL CALC-SCNC: 7 MMOL/L (ref 0–11.9)
AST SERPL-CCNC: 23 U/L (ref 12–45)
BILIRUB SERPL-MCNC: 0.9 MG/DL (ref 0.1–1.5)
BUN SERPL-MCNC: 22 MG/DL (ref 8–22)
CALCIUM SERPL-MCNC: 9.9 MG/DL (ref 8.5–10.5)
CHLORIDE SERPL-SCNC: 99 MMOL/L (ref 96–112)
CO2 SERPL-SCNC: 28 MMOL/L (ref 20–33)
CREAT SERPL-MCNC: 1.13 MG/DL (ref 0.5–1.4)
CREAT UR-MCNC: 16.9 MG/DL
EST. AVERAGE GLUCOSE BLD GHB EST-MCNC: 212 MG/DL
GLOBULIN SER CALC-MCNC: 2.3 G/DL (ref 1.9–3.5)
GLUCOSE SERPL-MCNC: 309 MG/DL (ref 65–99)
HBA1C MFR BLD: 9 % (ref 0–5.6)
MICROALBUMIN UR-MCNC: <0.7 MG/DL
MICROALBUMIN/CREAT UR: NORMAL MG/G (ref 0–30)
POTASSIUM SERPL-SCNC: 4.3 MMOL/L (ref 3.6–5.5)
PROT SERPL-MCNC: 7.2 G/DL (ref 6–8.2)
SODIUM SERPL-SCNC: 134 MMOL/L (ref 135–145)
TSH SERPL DL<=0.005 MIU/L-ACNC: 2.47 UIU/ML (ref 0.38–5.33)

## 2019-02-01 PROCEDURE — 84681 ASSAY OF C-PEPTIDE: CPT

## 2019-02-01 PROCEDURE — 82043 UR ALBUMIN QUANTITATIVE: CPT

## 2019-02-01 PROCEDURE — 80053 COMPREHEN METABOLIC PANEL: CPT

## 2019-02-01 PROCEDURE — 82570 ASSAY OF URINE CREATININE: CPT

## 2019-02-01 PROCEDURE — 83036 HEMOGLOBIN GLYCOSYLATED A1C: CPT

## 2019-02-01 PROCEDURE — 36415 COLL VENOUS BLD VENIPUNCTURE: CPT

## 2019-02-01 PROCEDURE — 86341 ISLET CELL ANTIBODY: CPT

## 2019-02-01 PROCEDURE — 84443 ASSAY THYROID STIM HORMONE: CPT

## 2019-02-03 LAB — C PEPTIDE SERPL-MCNC: 1 NG/ML (ref 0.8–3.5)

## 2019-02-04 LAB — GAD65 AB SER IA-ACNC: >250 IU/ML (ref 0–5)

## 2019-02-07 ENCOUNTER — OFFICE VISIT (OUTPATIENT)
Dept: ENDOCRINOLOGY | Facility: MEDICAL CENTER | Age: 54
End: 2019-02-07
Payer: COMMERCIAL

## 2019-02-07 VITALS
WEIGHT: 162 LBS | SYSTOLIC BLOOD PRESSURE: 140 MMHG | OXYGEN SATURATION: 98 % | HEART RATE: 82 BPM | BODY MASS INDEX: 21.94 KG/M2 | DIASTOLIC BLOOD PRESSURE: 83 MMHG | HEIGHT: 72 IN

## 2019-02-07 DIAGNOSIS — E13.9 LADA (LATENT AUTOIMMUNE DIABETES IN ADULTS), MANAGED AS TYPE 2 (HCC): ICD-10-CM

## 2019-02-07 DIAGNOSIS — Z79.4 ENCOUNTER FOR LONG-TERM (CURRENT) USE OF INSULIN (HCC): ICD-10-CM

## 2019-02-07 LAB — RETINAL SCREEN: NEGATIVE

## 2019-02-07 PROCEDURE — 92250 FUNDUS PHOTOGRAPHY W/I&R: CPT | Mod: TC | Performed by: PHYSICIAN ASSISTANT

## 2019-02-07 PROCEDURE — 99214 OFFICE O/P EST MOD 30 MIN: CPT | Performed by: PHYSICIAN ASSISTANT

## 2019-02-07 NOTE — PATIENT INSTRUCTIONS
He is now on:  1. Trulicity 0.75 once a week (Increase to 1.5)  2.  Tresiba 4 units at night (in a month if needed  3.  Humalog (only as needed)   4.  Ketone Urine test strips  5.  Farxiga 5mg one a day (start in two weeks if numbers are not better but you will need to check urine ketone)

## 2019-02-07 NOTE — PROGRESS NOTES
Return to office Patient Consult Note  Referred by: Mikael Tineo M.D.    Reason for consult: Diabetes Management Type 1.5    HPI:  Aaron Chaudhari is a 53 y.o. old patient who is seeing us today for diabetes care.  This is a pleasant patient with diabetes and I appreciate the opportunity to participate in the care of this patient.    Labs of 2/1/19 c-peptide 1.0, IAN-65 >250, glucose is 309, HbA1c is 9.0  Labs of 7/26/18 c-peptid is 0.9, IAN-65 >250, HbA1c is 6.9  From [facility] 5/2017:  His  C-peptide is 0.34 and IAN-65  >250  Labs of 7/17/17 C-peptide 1.1, IAN>250, Insulin Ab. 8.7, Islet cell negative    BG Diary:2/7/2019  No log    BG Diary:7/24/2017  In the AM: 103, 110, 109, 102, 114, 107, 112, 118  Before Bed: 91, 113, 169, 104, 128, 111, 108     He has a Dexcom 5 CGM - this certainly helps him with his Hypoglycemic events.        1. 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    He was on Lantus 4 units  Humalog    1:15 carb ratio  ISF - He does not use     He was just diagnosed within the last 12 months.  If he uses more than 4 units of lantus he has hypoglycemic events.  He is not using a ISF because it will tank him.  He will decrease the Lantus to 2-3 units if he knows he is going to be exercising the next day, because if he does not it will tank his numbers.       This is a new patient with me today and is being seen in [place] and getting very good help with his T1.    He is on Lantus 4 units  Humalog    1:15 carb ratio  ISF - He does not use     He is now on:  1. Trulicity 0.75 once a week  2.  Lantus (Stopped)  3.  Humalog (only as needed)  4.  Ketone Urine test strips       2. 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  Eyes:  No visual  changes.  Cardiac: No chest pain, No palpitations or racing heart rate.  Resp: No shortness of breath, No cough,   Gi: No Diarrhea    All other systems were reviewed and were/are negative.  The ROS was revised/revisited during this office visit from the patients first office visit with me on 7/13/17 Please review the full ROS during the first office visit.    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 No pertinent surgical history.    Allergies:  Patient has no known allergies.    Social History:  Social History     Social History   • Marital status:      Spouse name: N/A   • Number of children: N/A   • Years of education: N/A     Occupational History   • Not on file.     Social History Main Topics   • Smoking status: Never Smoker   • Smokeless tobacco: Never Used   • Alcohol use Yes   • Drug use: No   • Sexual activity: Not on file     Other Topics Concern   • Not on file     Social History Narrative   • No narrative on file       Family History:  Family History   Problem Relation Age of Onset   • Cancer Mother    • Heart Disease Paternal Grandmother        Medications:    Current Outpatient Prescriptions:   •  TRULICITY 0.75 MG/0.5ML Solution Pen-injector, INJECT 1 PEN AS INSTRUCTED EVERY 7 DAYS., Disp: 4 PEN, Rfl: 8  •  Ketone Blood Test (PRECISION XTRA) Strip, 1 Strip by In Vitro route as needed., Disp: 20 Strip, Rfl: 5        Physical Examination:   Vital signs: /83 (BP Location: Left arm, Patient Position: Sitting)   Pulse 82   Ht 1.829 m (6')   Wt 73.5 kg (162 lb)   SpO2 98%   BMI 21.97 kg/m²   General: No distress, cooperative, well dressed and well nourished.   Eyes: No scleral icterus or discharge, No hyposphagma  ENMT: Normal on external inspection of nose,  lips, No nasal drainage   Neck: No abnormal masses on inspection  Resp: Normal effort, Bilateral clear to auscultation, No wheezing, No rales  CVS: Regular rate and rhythm, S1 S2 normal, No murmur. No gallop  Extremities: No edema bilateral extremities  Neuro: Alert and oriented  Skin: No rash, No Ulcers  Psych: Normal mood and affect      Assessment and Plan:    1. 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 Trulicity 0.75 once a week (Increase to 1.5)  2.  Tresiba 4 units at night (in a month if needed  3.  Humalog (only as needed)   4.  Ketone Urine test strips  5.  Farxiga 5mg one a day (start in two weeks if numbers are not better but you will need to check urine ketone)      2. Encounter for long-term (current) use of insulin (HCC)  Not on insulin currentl;y    Return in about 3 months (around 5/7/2019).    Blood glucose log: Check BG in the morning when wake up, before lunch or dinner and before bed.  So three times a day.  Always bring BG diary to the next office visit.     Thank you kindly for allowing me to participate in the diabetes care plan for this patient.    Ace Sung PA-C, BC-ADM  Board Certified - Advanced Diabetes Management  02/07/19    CC:   Mikael Tineo M.D.

## 2019-10-14 RX ORDER — DULAGLUTIDE 1.5 MG/.5ML
INJECTION, SOLUTION SUBCUTANEOUS
Qty: 4 PEN | Refills: 6 | Status: SHIPPED | OUTPATIENT
Start: 2019-10-14

## 2019-10-14 NOTE — TELEPHONE ENCOUNTER
Was the patient seen in the last year in this department? Yes 02/07/19    Does patient have an active prescription for medications requested? No     Received Request Via: Pharmacy     TRULICITY 1.5 MG/0.5ML Solution Pen-injector    Sig: INJECT 0.5 ML UNDER THE SKIN AS INSTRUCTED EVERY 7 DAYS.

## 2025-05-20 ENCOUNTER — APPOINTMENT (OUTPATIENT)
Dept: URBAN - NONMETROPOLITAN AREA CLINIC 1 | Facility: CLINIC | Age: 60
Setting detail: DERMATOLOGY
End: 2025-05-20

## 2025-05-20 DIAGNOSIS — D485 NEOPLASM OF UNCERTAIN BEHAVIOR OF SKIN: ICD-10-CM

## 2025-05-20 DIAGNOSIS — L82.1 OTHER SEBORRHEIC KERATOSIS: ICD-10-CM

## 2025-05-20 DIAGNOSIS — L81.4 OTHER MELANIN HYPERPIGMENTATION: ICD-10-CM

## 2025-05-20 DIAGNOSIS — D22 MELANOCYTIC NEVI: ICD-10-CM

## 2025-05-20 DIAGNOSIS — L57.0 ACTINIC KERATOSIS: ICD-10-CM

## 2025-05-20 DIAGNOSIS — Z41.9 ENCOUNTER FOR PROCEDURE FOR PURPOSES OTHER THAN REMEDYING HEALTH STATE, UNSPECIFIED: ICD-10-CM

## 2025-05-20 DIAGNOSIS — Z71.89 OTHER SPECIFIED COUNSELING: ICD-10-CM

## 2025-05-20 PROBLEM — D48.5 NEOPLASM OF UNCERTAIN BEHAVIOR OF SKIN: Status: ACTIVE | Noted: 2025-05-20

## 2025-05-20 PROBLEM — D22.5 MELANOCYTIC NEVI OF TRUNK: Status: ACTIVE | Noted: 2025-05-20

## 2025-05-20 PROCEDURE — ? COSMETIC CONSULTATION: GENERAL

## 2025-05-20 PROCEDURE — 11102 TANGNTL BX SKIN SINGLE LES: CPT

## 2025-05-20 PROCEDURE — 17000 DESTRUCT PREMALG LESION: CPT | Mod: 59

## 2025-05-20 PROCEDURE — 17003 DESTRUCT PREMALG LES 2-14: CPT

## 2025-05-20 PROCEDURE — ? COUNSELING

## 2025-05-20 PROCEDURE — 99203 OFFICE O/P NEW LOW 30 MIN: CPT | Mod: 25

## 2025-05-20 PROCEDURE — ? BIOPSY BY SHAVE METHOD

## 2025-05-20 PROCEDURE — ? ADDITIONAL NOTES

## 2025-05-20 PROCEDURE — ? LIQUID NITROGEN

## 2025-05-20 PROCEDURE — ? SUNSCREEN RECOMMENDATIONS

## 2025-05-20 PROCEDURE — ? REFERRAL CORRESPONDENCE

## 2025-05-20 ASSESSMENT — LOCATION DETAILED DESCRIPTION DERM
LOCATION DETAILED: RIGHT INFERIOR FOREHEAD
LOCATION DETAILED: 2ND WEB SPACE RIGHT HAND
LOCATION DETAILED: INFERIOR MID FOREHEAD
LOCATION DETAILED: LEFT FOREHEAD
LOCATION DETAILED: LEFT RADIAL DORSAL HAND
LOCATION DETAILED: LEFT ULNAR DORSAL HAND
LOCATION DETAILED: SUPERIOR THORACIC SPINE
LOCATION DETAILED: RIGHT SUPERIOR CENTRAL MALAR CHEEK
LOCATION DETAILED: RIGHT SUPERIOR HELIX
LOCATION DETAILED: LEFT CENTRAL MALAR CHEEK
LOCATION DETAILED: NASAL DORSUM
LOCATION DETAILED: LEFT ANTIHELIX
LOCATION DETAILED: RIGHT SUPERIOR FOREHEAD
LOCATION DETAILED: LEFT INFERIOR MEDIAL UPPER BACK
LOCATION DETAILED: LEFT VENTRAL PROXIMAL FOREARM

## 2025-05-20 ASSESSMENT — LOCATION SIMPLE DESCRIPTION DERM
LOCATION SIMPLE: LEFT UPPER BACK
LOCATION SIMPLE: RIGHT EAR
LOCATION SIMPLE: INFERIOR FOREHEAD
LOCATION SIMPLE: RIGHT HAND
LOCATION SIMPLE: RIGHT CHEEK
LOCATION SIMPLE: NOSE
LOCATION SIMPLE: LEFT CHEEK
LOCATION SIMPLE: LEFT EAR
LOCATION SIMPLE: UPPER BACK
LOCATION SIMPLE: LEFT FOREHEAD
LOCATION SIMPLE: RIGHT FOREHEAD
LOCATION SIMPLE: LEFT FOREARM
LOCATION SIMPLE: LEFT HAND

## 2025-05-20 ASSESSMENT — LOCATION ZONE DERM
LOCATION ZONE: TRUNK
LOCATION ZONE: NOSE
LOCATION ZONE: EAR
LOCATION ZONE: ARM
LOCATION ZONE: FACE
LOCATION ZONE: HAND

## 2025-06-30 ENCOUNTER — APPOINTMENT (OUTPATIENT)
Dept: URBAN - NONMETROPOLITAN AREA CLINIC 1 | Facility: CLINIC | Age: 60
Setting detail: DERMATOLOGY
End: 2025-06-30

## 2025-06-30 DIAGNOSIS — L57.0 ACTINIC KERATOSIS: ICD-10-CM

## 2025-06-30 PROCEDURE — ? COUNSELING

## 2025-06-30 PROCEDURE — ? LIQUID NITROGEN

## 2025-06-30 PROCEDURE — ? DIAGNOSIS COMMENT

## 2025-06-30 ASSESSMENT — LOCATION SIMPLE DESCRIPTION DERM
LOCATION SIMPLE: NECK
LOCATION SIMPLE: LEFT ANTERIOR NECK
LOCATION SIMPLE: LEFT FOREARM

## 2025-06-30 ASSESSMENT — LOCATION ZONE DERM
LOCATION ZONE: ARM
LOCATION ZONE: NECK

## 2025-06-30 ASSESSMENT — LOCATION DETAILED DESCRIPTION DERM
LOCATION DETAILED: LEFT CENTRAL LATERAL NECK
LOCATION DETAILED: LEFT CLAVICULAR NECK
LOCATION DETAILED: LEFT VENTRAL PROXIMAL FOREARM

## 2025-06-30 NOTE — PROCEDURE: LIQUID NITROGEN
Detail Level: Detailed
Show Aperture Variable?: Yes
Render Post-Care Instructions In Note?: no
Duration Of Freeze Thaw-Cycle (Seconds): 2
Post-Care Instructions: I reviewed with the patient in detail post-care instructions. Patient is to wear sunprotection, and avoid picking at any of the treated lesions. Pt may apply Vaseline to crusted or scabbing areas.
Number Of Freeze-Thaw Cycles: 2 freeze-thaw cycles
Consent: The patient's verbal consent was obtained including but not limited to risks of crusting, scabbing, blistering, scarring, darker or lighter pigmentary change, recurrence, incomplete removal and infection.
Application Tool (Optional): Cry-AC